# Patient Record
Sex: MALE | Race: WHITE | NOT HISPANIC OR LATINO | Employment: UNEMPLOYED | URBAN - METROPOLITAN AREA
[De-identification: names, ages, dates, MRNs, and addresses within clinical notes are randomized per-mention and may not be internally consistent; named-entity substitution may affect disease eponyms.]

---

## 2020-10-01 ENCOUNTER — HOSPITAL ENCOUNTER (EMERGENCY)
Facility: HOSPITAL | Age: 30
Discharge: HOME | End: 2020-10-02
Attending: EMERGENCY MEDICINE | Admitting: EMERGENCY MEDICINE

## 2020-10-01 DIAGNOSIS — S05.01XA ABRASION OF RIGHT CORNEA, INITIAL ENCOUNTER: Primary | ICD-10-CM

## 2020-10-01 PROCEDURE — 99283 EMERGENCY DEPT VISIT LOW MDM: CPT

## 2020-10-02 VITALS
WEIGHT: 201.5 LBS | HEIGHT: 70 IN | HEART RATE: 79 BPM | TEMPERATURE: 97.1 F | SYSTOLIC BLOOD PRESSURE: 118 MMHG | BODY MASS INDEX: 28.85 KG/M2 | OXYGEN SATURATION: 100 % | DIASTOLIC BLOOD PRESSURE: 74 MMHG | RESPIRATION RATE: 18 BRPM

## 2020-10-02 RX ORDER — LISINOPRIL 20 MG/1
TABLET ORAL
COMMUNITY

## 2020-10-02 RX ORDER — DIGOXIN 125 MCG
TABLET ORAL
COMMUNITY

## 2020-10-02 RX ORDER — ESCITALOPRAM OXALATE 20 MG/1
TABLET ORAL
COMMUNITY

## 2020-10-02 RX ORDER — SPIRONOLACTONE 25 MG/1
TABLET ORAL
COMMUNITY

## 2020-10-02 RX ORDER — LEVOFLOXACIN 5 MG/ML
1 SOLUTION/ DROPS TOPICAL
Qty: 5 ML | Refills: 0 | Status: SHIPPED | OUTPATIENT
Start: 2020-10-02 | End: 2020-10-09

## 2020-10-02 RX ORDER — TETRACAINE HYDROCHLORIDE 5 MG/ML
2 SOLUTION OPHTHALMIC ONCE
Status: COMPLETED | OUTPATIENT
Start: 2020-10-02 | End: 2020-10-02

## 2020-10-02 RX ORDER — LOSARTAN POTASSIUM 50 MG/1
TABLET ORAL
COMMUNITY

## 2020-10-02 RX ORDER — CARVEDILOL 25 MG/1
TABLET ORAL
COMMUNITY

## 2020-10-02 RX ADMIN — FLUORESCEIN SODIUM 1 STRIP: 1 STRIP OPHTHALMIC at 00:39

## 2020-10-02 RX ADMIN — TETRACAINE HYDROCHLORIDE 2 DROP: 5 SOLUTION OPHTHALMIC at 00:39

## 2020-10-02 NOTE — ED PROVIDER NOTES
Pt presents to the ED c/o  body sensation in the right.  He does wear contacts.     On exam,   Awake, alert, no acute distress.  Right eye: There is scleral injection.  Small corneal abrasion at approximately 3:00.     Plan: We will discharge on antibiotic drops.  He will follow-up with his ophthalmologist.  I cautioned him he is not to wear his contacts until he follows up with his ophthalmologist.      Appropriate PPE was worn by myself and the patient throughout entire interaction.       Attestation:  The DARIELA and I have discussed this patient's history, physical exam, and treatment plan.  I have reviewed the documentation and personally had a face to face interaction with the patient. I affirm the documentation and agree with the treatment and plan.  The attached note describes my personal findings.            Mitchel Becerra MD  10/02/20 0151

## 2020-10-02 NOTE — ED PROVIDER NOTES
EMERGENCY DEPARTMENT ENCOUNTER    Room Number:  05/05  Date seen:  10/2/2020  Time seen: 23:45 EDT  PCP: Jaky Milian MD  Historian:     HPI:  Chief complaint: Eye pain  A complete HPI/ROS/PMH/PSH/SH/FH are unobtainable due to: None  Context:Frederick Haro is a 30 y.o. male, who presents to the ED with c/o walking his dog around 5 PM tonight when he felt like something went in his right eye and developed right eye pain.  He has been rubbing his right eye since then and unsure if his right contact lens is still in.  Patient reports last tetanus shot was 1 year ago.  Patient denies vision changes, headache, eye discharge.        Patient was placed in face mask in first look. Patient was wearing facemask when I entered the room and throughout our encounter. I wore full protective equipment throughout this patient encounter including a face mask, and gloves. Hand hygiene was performed before donning protective equipment and after removal when leaving the room.      MEDICAL RECORD REVIEW      ALLERGIES  Heparin and Penicillins    PAST MEDICAL HISTORY  Active Ambulatory Problems     Diagnosis Date Noted   • No Active Ambulatory Problems     Resolved Ambulatory Problems     Diagnosis Date Noted   • No Resolved Ambulatory Problems     Past Medical History:   Diagnosis Date   • Brain injury (CMS/HCC)    • Cardiomyopathy as manifestation of underlying disease (CMS/HCC)    • Coma (CMS/HCC) 10 years ago   • Pacemaker        PAST SURGICAL HISTORY  Past Surgical History:   Procedure Laterality Date   • CARDIAC SURGERY      Pace Maker   • ELBOW ARTHROPLASTY     • KNEE CARTILAGE SURGERY         FAMILY HISTORY  History reviewed. No pertinent family history.    SOCIAL HISTORY  Social History     Socioeconomic History   • Marital status: Single     Spouse name: Not on file   • Number of children: Not on file   • Years of education: Not on file   • Highest education level: Not on file   Tobacco Use   • Smoking status: Current Every  Day Smoker     Packs/day: 0.50     Types: Cigarettes   Substance and Sexual Activity   • Alcohol use: Never     Frequency: Never   • Drug use: Never   • Sexual activity: Defer       REVIEW OF SYSTEMS  Review of Systems    All systems reviewed and negative except for those discussed in HPI.     PHYSICAL EXAM    ED Triage Vitals [10/01/20 2321]   Temp Heart Rate Resp BP SpO2   97.1 °F (36.2 °C) 79 18 -- 100 %      Temp src Heart Rate Source Patient Position BP Location FiO2 (%)   Tympanic -- -- -- --     Physical Exam    I have reviewed the triage vital signs and nursing notes.      GENERAL: not distressed  HENT: nares patent  EYES: no scleral icterus; right eye: There is scleral injection.  Small corneal abrasion at approximately 3:00 lateral to his pupil, extraocular movements are intact  NECK: no ROM limitations  CV: regular rhythm, regular rate  RESPIRATORY: normal effort; ctab  MUSCULOSKELETAL: no deformity  NEURO: alert, moves all extremities, follows commands  SKIN: warm, dry    LAB RESULTS  No results found for this or any previous visit (from the past 24 hour(s)).      RADIOLOGY RESULTS  No orders to display         PROGRESS, DATA ANALYSIS, CONSULTS AND MEDICAL DECISION MAKING  All labs have been independently reviewed by me.  All radiology studies have been reviewed by me and discussed with radiologist dictating the report. Discussion below represents my analysis of pertinent findings related to patient's condition, differential diagnosis, treatment plan and final disposition.          The differential diagnosis include but are not limited to corneal abrasion, foreign body in cornea.    Since patient wears contacts, will prescribe levofloxacin antibiotic ophthalmic drops.  Advised him to follow-up with Dr. Navas his optometrist.  I advised him to avoid wearing his contact until he follows up with his eye doctor.  He expressed understanding and all questions addressed at this time.       Reviewed pt's  "history and workup with Dr. Becerra.  After a bedside evaluation, Dr. Becerra agrees with the plan of care.    (FOR DISCHARGE)The patient's history, physical exam, and lab findings were discussed with the physician, who also performed a face to face history and physical exam.  I discussed all results and noted any abnormalities with patient.  Discussed absoute need to recheck abnormalities with their family physician.  I answered any of the patient's questions.  Discussed plan for discharge, as there is no emergent indication for admission.  Pt is agreeable and understands need for follow up and repeat testing.  Pt is aware that discharge does not mean that nothing is wrong but it indicates no emergency is present and they must continue care with their family physician.  Pt is discharged with instructions to follow up with primary care doctor to have their blood pressure rechecked.           Disposition vitals:  /74   Pulse 79   Temp 97.1 °F (36.2 °C) (Tympanic)   Resp 18   Ht 177.8 cm (70\")   Wt 91.4 kg (201 lb 8 oz)   SpO2 100%   BMI 28.91 kg/m²       DIAGNOSIS  Final diagnoses:   Abrasion of right cornea, initial encounter       FOLLOW UP   Jaky Milian MD  [address]  [facility] [address] [phone]          [facility]  [address] [facility] [address] [phone]             Linda Javed PA-C  10/02/20 0619    "

## 2020-10-02 NOTE — DISCHARGE INSTRUCTIONS
Please follow-up with Dr. Navas in about 5 days to recheck your corneal abrasion.  Return to ER develop any concerning or worsening symptoms.

## 2020-10-02 NOTE — ED TRIAGE NOTES
Pt arrived via [facility] with complaints of right eye irritation due to his contact being stuck in his eye for the past 3 hours. Pt reports it ripped and he has been unable to get it out.   Right eye is very red, irritated and swollen.    Pt has a mask in place as well as this RN.